# Patient Record
Sex: MALE | Race: OTHER | Employment: FULL TIME | URBAN - METROPOLITAN AREA
[De-identification: names, ages, dates, MRNs, and addresses within clinical notes are randomized per-mention and may not be internally consistent; named-entity substitution may affect disease eponyms.]

---

## 2020-09-05 ENCOUNTER — APPOINTMENT (OUTPATIENT)
Dept: GENERAL RADIOLOGY | Age: 40
End: 2020-09-05
Attending: EMERGENCY MEDICINE

## 2020-09-05 ENCOUNTER — HOSPITAL ENCOUNTER (EMERGENCY)
Age: 40
Discharge: HOME | End: 2020-09-05
Attending: EMERGENCY MEDICINE

## 2020-09-05 VITALS
DIASTOLIC BLOOD PRESSURE: 79 MMHG | RESPIRATION RATE: 8 BRPM | SYSTOLIC BLOOD PRESSURE: 116 MMHG | WEIGHT: 165 LBS | OXYGEN SATURATION: 96 % | TEMPERATURE: 98 F | HEIGHT: 65 IN | HEART RATE: 59 BPM | BODY MASS INDEX: 27.49 KG/M2

## 2020-09-05 DIAGNOSIS — K21.9 GASTROESOPHAGEAL REFLUX DISEASE, ESOPHAGITIS PRESENCE NOT SPECIFIED: Primary | ICD-10-CM

## 2020-09-05 DIAGNOSIS — R00.2 PALPITATIONS: ICD-10-CM

## 2020-09-05 LAB
ALBUMIN SERPL-MCNC: 4 G/DL (ref 3.5–5)
ALBUMIN/GLOB SERPL: 1.3 {RATIO} (ref 1.1–2.2)
ALP SERPL-CCNC: 95 U/L (ref 45–117)
ALT SERPL-CCNC: 32 U/L (ref 12–78)
ANION GAP SERPL CALC-SCNC: 6 MMOL/L (ref 5–15)
AST SERPL-CCNC: 26 U/L (ref 15–37)
BASOPHILS # BLD: 0 K/UL (ref 0–0.1)
BASOPHILS NFR BLD: 1 % (ref 0–1)
BILIRUB SERPL-MCNC: 0.6 MG/DL (ref 0.2–1)
BUN SERPL-MCNC: 15 MG/DL (ref 6–20)
BUN/CREAT SERPL: 15 (ref 12–20)
CALCIUM SERPL-MCNC: 8.6 MG/DL (ref 8.5–10.1)
CHLORIDE SERPL-SCNC: 109 MMOL/L (ref 97–108)
CO2 SERPL-SCNC: 25 MMOL/L (ref 21–32)
COMMENT, HOLDF: NORMAL
CREAT SERPL-MCNC: 1.01 MG/DL (ref 0.7–1.3)
D DIMER PPP FEU-MCNC: <0.19 MG/L FEU (ref 0–0.65)
DIFFERENTIAL METHOD BLD: NORMAL
EOSINOPHIL # BLD: 0.1 K/UL (ref 0–0.4)
EOSINOPHIL NFR BLD: 2 % (ref 0–7)
ERYTHROCYTE [DISTWIDTH] IN BLOOD BY AUTOMATED COUNT: 12 % (ref 11.5–14.5)
GLOBULIN SER CALC-MCNC: 3 G/DL (ref 2–4)
GLUCOSE SERPL-MCNC: 97 MG/DL (ref 65–100)
HCT VFR BLD AUTO: 43 % (ref 36.6–50.3)
HGB BLD-MCNC: 15.3 G/DL (ref 12.1–17)
IMM GRANULOCYTES # BLD AUTO: 0 K/UL (ref 0–0.04)
IMM GRANULOCYTES NFR BLD AUTO: 0 % (ref 0–0.5)
INR PPP: 1.1 (ref 0.9–1.1)
LYMPHOCYTES # BLD: 2 K/UL (ref 0.8–3.5)
LYMPHOCYTES NFR BLD: 31 % (ref 12–49)
MCH RBC QN AUTO: 30.1 PG (ref 26–34)
MCHC RBC AUTO-ENTMCNC: 35.6 G/DL (ref 30–36.5)
MCV RBC AUTO: 84.5 FL (ref 80–99)
MONOCYTES # BLD: 0.6 K/UL (ref 0–1)
MONOCYTES NFR BLD: 9 % (ref 5–13)
NEUTS SEG # BLD: 3.7 K/UL (ref 1.8–8)
NEUTS SEG NFR BLD: 57 % (ref 32–75)
NRBC # BLD: 0 K/UL (ref 0–0.01)
NRBC BLD-RTO: 0 PER 100 WBC
PLATELET # BLD AUTO: 274 K/UL (ref 150–400)
PMV BLD AUTO: 9.8 FL (ref 8.9–12.9)
POTASSIUM SERPL-SCNC: 3.8 MMOL/L (ref 3.5–5.1)
PROT SERPL-MCNC: 7 G/DL (ref 6.4–8.2)
PROTHROMBIN TIME: 11.2 SEC (ref 9–11.1)
RBC # BLD AUTO: 5.09 M/UL (ref 4.1–5.7)
SAMPLES BEING HELD,HOLD: NORMAL
SODIUM SERPL-SCNC: 140 MMOL/L (ref 136–145)
TROPONIN I SERPL-MCNC: <0.05 NG/ML
WBC # BLD AUTO: 6.4 K/UL (ref 4.1–11.1)

## 2020-09-05 PROCEDURE — 80053 COMPREHEN METABOLIC PANEL: CPT

## 2020-09-05 PROCEDURE — 84484 ASSAY OF TROPONIN QUANT: CPT

## 2020-09-05 PROCEDURE — 85025 COMPLETE CBC W/AUTO DIFF WBC: CPT

## 2020-09-05 PROCEDURE — 74011250637 HC RX REV CODE- 250/637: Performed by: EMERGENCY MEDICINE

## 2020-09-05 PROCEDURE — 99285 EMERGENCY DEPT VISIT HI MDM: CPT

## 2020-09-05 PROCEDURE — 36415 COLL VENOUS BLD VENIPUNCTURE: CPT

## 2020-09-05 PROCEDURE — 85610 PROTHROMBIN TIME: CPT

## 2020-09-05 PROCEDURE — 74011000250 HC RX REV CODE- 250: Performed by: EMERGENCY MEDICINE

## 2020-09-05 PROCEDURE — 93005 ELECTROCARDIOGRAM TRACING: CPT

## 2020-09-05 PROCEDURE — 71046 X-RAY EXAM CHEST 2 VIEWS: CPT

## 2020-09-05 PROCEDURE — 85379 FIBRIN DEGRADATION QUANT: CPT

## 2020-09-05 RX ORDER — FAMOTIDINE 20 MG/1
20 TABLET, FILM COATED ORAL 2 TIMES DAILY
Qty: 60 TAB | Refills: 0 | Status: SHIPPED | OUTPATIENT
Start: 2020-09-05 | End: 2020-10-05

## 2020-09-05 RX ADMIN — LIDOCAINE HYDROCHLORIDE 40 ML: 20 SOLUTION ORAL; TOPICAL at 07:59

## 2020-09-05 NOTE — DISCHARGE INSTRUCTIONS
Patient Education        Enfermedad de reflujo gastroesofágico (GERD): Instrucciones de cuidado  Gastroesophageal Reflux Disease (GERD): Care Instructions  Instrucciones de cuidado     La enfermedad por reflujo gastroesofágico (ERGE) es cuando el ácido estomacal fluye de vuelta al esófago. El esófago es el tubo que va desde la garganta al [facility]. Maxene Curlin de teagan vera vía pedro que el ácido estomacal fluya de vuelta por maggie tubo. Cuando usted tiene Uvycvugot-wzèl-Pqfzwd, esta válvula no se bridgette herméticamente. [place] también puede causar dolor e hinchazón en el esófago (esofagitis). Si tiene síntomas leves de ERGE, halie acidez [place], es posible que pueda controlar el problema con antiácidos o medicamentos de [place]. Cambiar la dieta y los hábitos de alimentación, halie no comer tarde por la noche, bajar de peso y hacer otros cambios en el estilo de fe también puede ayudar a [employer]. La atención de seguimiento es teagan parte clave de hood tratamiento y seguridad. Asegúrese de hacer y acudir a todas las citas, y llame a hood médico si está teniendo problemas. También es teagan buena idea saber los resultados de jodie exámenes y mantener teagan lista de los medicamentos que martina. ¿Cómo puede cuidarse en el hogar? · [employer] medicamentos exactamente halie le fueron recetados. Llame a hood médico si nunu estar teniendo problemas con hood medicamento. · Hood médico puede recomendarle medicamentos de [place]. Para la indigestión leve u ocasional, los antiácidos, halie Tums, Gaviscon, Mylanta o Maalox, pueden ayudar. Hood médico también puede recomendarle inhibidores de la secreción ácida de venta maurice, halie Pepcid AC (famotidina), Tagamet HB (cimetidina) o Prilosec (omeprazol). Peggy y siga todas las instrucciones de la [place]. Si utiliza estos medicamentos con frecuencia, hable con hood médico.  · Cambie jodie hábitos de alimentación. ? Es mejor comer varias comidas pequeñas en lugar de dos o suzanne comidas abundantes. ?  Después de comer, espere de 2 a 3 horas antes de recostarse. ? El chocolate, la menta y el alcohol pueden empeorar la Pglyxlsou-fhèf-Ztprji. ? En [employer], los alimentos picantes, los alimentos que tienen mucho ácido (halie los tomates y las naranjas) y el café pueden empeorar los síntomas de la Vkncqwljp-jdèn-Ivyyox. Si jazzy síntomas empeoran después de comer un determinado alimento, se recomienda que deje de comer zuri alimento para radha si jazzy síntomas mejoran. · No fume ni masque tabaco. Fumar puede agravar la ERGE. Si necesita ayuda para dejar de usar tabaco, hable con gaspar médico sobre programas y medicamentos para dejar de usarlo. Estos pueden aumentar jazzy probabilidades de dejar el hábito para siempre. · Si tiene síntomas de Llmrmupor-rwèo-Ycwuuz marcelo la noche, eleve la cabecera de gaspar cama entre 6 y 6 pulgadas (entre 15 y 21 cm) colocando ladrillos debajo del otilio de la cama o teagan cuña de espuma debajo de la cabecera del colchón. (Usar almohadas adicionales no funciona). · No use ropa ajustada alrededor [place] del cuerpo. · Baje de peso, si necesita hacerlo. Perder tan solo entre 5 y 10 libras (2.3 a 4.5 kg) puede ayudarle. ¿Cuándo debe pedir ayuda? Llame a gaspar médico ahora mismo o busque atención médica inmediata si:    · Tiene un dolor de estómago nuevo o distinto.     · Jazzy heces son negruzcas y parecidas al alquitrán o tienen rastros de [place]-ni'. Preste especial atención a los cambios en gaspar dasha y asegúrese de comunicarse con gaspar médico si:    · Jazzy síntomas no corea maria dolores después de 2 días.     · La comida parece quedarle atorada en la garganta o el pecho. ¿Dónde puede encontrar más información en inglés? Yaakov Amezcua a http://violeta-nuno.info/  [employer] W245 en la búsqueda para aprender más acerca de \"Enfermedad de reflujo gastroesofágico (GERD): Instrucciones de cuidado. \"  Revisado: 15 emilie, 2020               Versión del contenido: 12.6  © 5938-2072 Healthwise, Incorporated.    Las instrucciones de cuidado fueron adaptadas bajo licencia por [employer] (which disclaims liability or warranty for this information). Si usted tiene [place] afección médica o sobre estas instrucciones, siempre pregunte a gaspar profesional de dasha. [facility] niega toda garantía o responsabilidad por gaspar uso de esta información. Patient Education        Palpitaciones: Instrucciones de cuidado  Palpitations: Care Instructions  Instrucciones de cuidado    Las palpitaciones cardíacas son [place]  sensación desagradable de que gaspar corazón está latiendo rápido o de forma irregular. Puede sentir [place]  palpitación umang o un aleteo en el pecho. Podría sentirse halie si el corazón estuviese saltándose latidos. Aunque las palpitaciones pueden ser causadas por un problema cardíaco, también pueden ocurrir debido a estrés, fatiga, o al consumo de alcohol, cafeína o nicotina. Muchos medicamentos halie las pastillas para adelgazar, los antihistamínicos, los descongestionantes y algunos productos herbarios pueden causar palpitaciones cardíacas. Bess todas las personas tienen palpitaciones de vez en cuando. Según los ANNERSTAD, gaspar médico podría necesitar hacer más pruebas para tratar de encontrar la causa de jodie palpitaciones. La atención de seguimiento es teagan parte clave de gaspar tratamiento y seguridad. Asegúrese de hacer y acudir a todas las citas, y llame a gaspar médico si está teniendo problemas. También es teagan buena idea saber los resultados de jodie exámenes y mantener teagan lista de los medicamentos que martina. ¿Cómo puede cuidarse en el hogar? · Evite la cafeína, la nicotina y el exceso de alcohol. · No consuma drogas ilegales, halie metanfetaminas y cocaína. · No tome medicamentos para bajar de peso o dietéticos a menos que consulte alfonso con gaspar médico.  · Duerma lo suficiente. · No coma en exceso. · Si tiene palpitaciones de nuevo, tayla respiraciones profundas y trate de [place].  [place] podría reducir la velocidad de un corazón acelerado. · Si comienza a sentir [place], acuéstese para evitar las lesiones que podrían ocurrir si se Luwanna Rave y  al suelo. · Mantenga un registro de jodie palpitaciones y llévelo a la siguiente britt con el médico. Anote:  ? Para Clause y la hora. ? Gaspar pulso. (Si gaspar corazón late rápido, podría ser difícil tomarse el pulso). ? Lo que estaba haciendo cuando Grant Alessandra. ?  [facility] ? Cualquier otro síntoma. · Si teagan actividad le causa palpitaciones, reduzca el ritmo o deje de [place]. Hable con gaspar médico antes de volver a hacer brigitte actividad. · [employer] medicamentos exactamente halie le fueron recetados. Llame a gaspar médico si nunu estar teniendo problemas con gaspar medicamento. ¿Cuándo debe pedir ayuda? Llame al 91 en cualquier momento que considere que necesita atención de [place]. Por ejemplo, llame si:    · Se desmayó (perdió el conocimiento).     · Tiene síntomas de un ataque al corazón. Estos podrían incluir:  ? Lucía [place] en el pecho, o teagan sensación extraña en el pecho.  ? Sudoración. ? Falta de aire. ? Dolor, presión o teagan sensación extraña en la espalda, el kiki, la mandíbula, la parte superior del abdomen, o en mauro o ambos hombros o brazos. ? Aturdimiento o debilidad repentina. ? Latidos cardíacos rápidos o irregulares. Después de llamar al 911, es posible que el operador le diga que mastique 1 aspirina para adultos o 2 a 4 aspirinas de dosis baja. Espere a la ambulancia. No trate de conducir usted mismo un automóvil.     · Tiene síntomas de un ataque cerebral. Estos pueden incluir:  ? Entumecimiento, hormigueo, debilidad o parálisis repentinos en la simran, el brazo o la pierna, sobre todo si ocurre en un solo lado del cuerpo. ? Cambios súbitos en la vista. ? Problemas repentinos para hablar. ? Confusión súbita o dificultad repentina para comprender frases sencillas. ? Problemas repentinos para caminar o mantener el equilibrio. ?  Un dolor de matthias intenso y repentino, distinto a los carlos de matthias anteriores. Llame a gaspar médico ahora mismo o busque atención médica inmediata si:    · Presenta palpitaciones cardíacas y:  ? Raydell Salvia o aturdimiento, o siente que se va a desmayar. ? Tiene nueva o mayor falta de aire. Preste especial atención a los cambios en gaspar dasha y asegúrese de comunicarse con gaspar médico si:    · Continúa teniendo palpitaciones. ¿Dónde puede encontrar más información en inglés? Vaya a http://viloeta-nuno.info/  Yelena Pall R508 en la búsqueda para aprender más acerca de \"Palpitaciones: Instrucciones de cuidado. \"  Revisado: 16 jacques, 5481               CASPER del contenido: 12.6  © 1704-3826 Healthwise, Incorporated. Las instrucciones de cuidado fueron adaptadas bajo licencia por [employer] (which disclaims liability or warranty for this information). Si usted tiene [place]o afección médica o sobre estas instrucciones, siempre pregunte a gaspar profesional de dasha. Healthwise, Incorporated niega toda garantía o responsabilidad por gaspar uso de esta información.

## 2020-09-05 NOTE — ED TRIAGE NOTES
Patient clarified that chest pain actually began 2 weeks ago while sleeping. He also adds, \"I am unsure of results of coronovirus test I had done in May. \" Denies associated symptoms.

## 2020-09-05 NOTE — ED TRIAGE NOTES
Triage completed using [employer] phone for translation (Interpretor #[id number]). Plan of care discussed and questions answered. Patient presents with mid-sternal chest pain that began yesterday. Patient denies nausea and/or diaphoresis with chest pain.

## 2020-09-05 NOTE — ED PROVIDER NOTES
The history is provided by the patient. Chest Pain    This is a new problem. The current episode started more than 1 week ago. The problem has not changed since onset. The problem occurs daily. The pain is associated with eating food. The pain is present in the substernal region. The pain is at a severity of 10/10. The pain is severe. The quality of the pain is described as burning. The pain does not radiate. The symptoms are aggravated by eating. Pertinent negatives include no abdominal pain, no back pain, no claudication, no cough, no diaphoresis, no dizziness, no exertional chest pressure, no fever, no headaches, no hemoptysis, no irregular heartbeat, no leg pain, no lower extremity edema, no malaise/fatigue, no nausea, no near-syncope, no numbness, no orthopnea, no palpitations, no PND, no shortness of breath, no sputum production, no vomiting and no weakness. He has tried nothing for the symptoms. The treatment provided no relief. Risk factors include no risk factors. His past medical history does not include aneurysm, cancer, DM, DVT, HTN, PE or CHF. No past medical history on file. No past surgical history on file. No family history on file.     Social History     Socioeconomic History    Marital status: Not on file     Spouse name: Not on file    Number of children: Not on file    Years of education: Not on file    Highest education level: Not on file   Occupational History    Not on file   Social Needs    Financial resource strain: Not on file    Food insecurity     Worry: Not on file     Inability: Not on file    Transportation needs     Medical: Not on file     Non-medical: Not on file   Tobacco Use    Smoking status: Not on file   Substance and Sexual Activity    Alcohol use: Not on file    Drug use: Not on file    Sexual activity: Not on file   Lifestyle    Physical activity     Days per week: Not on file     Minutes per session: Not on file    Stress: Not on file Relationships    Social connections     Talks on phone: Not on file     Gets together: Not on file     Attends [employer] service: Not on file     Active member of club or organization: Not on file     Attends meetings of clubs or organizations: Not on file     Relationship status: Not on file    Intimate partner violence     Fear of current or ex partner: Not on file     Emotionally abused: Not on file     Physically abused: Not on file     Forced sexual activity: Not on file   Other Topics Concern    Not on file   Social History Narrative    Not on file         ALLERGIES: Patient has no known allergies. Review of Systems   Constitutional: Negative for activity change, chills, diaphoresis, fever and malaise/fatigue. HENT: Negative for nosebleeds, sore throat, trouble swallowing and voice change. Eyes: Negative for visual disturbance. Respiratory: Negative for cough, hemoptysis, sputum production and shortness of breath. Cardiovascular: Positive for chest pain. Negative for palpitations, orthopnea, claudication, PND and near-syncope. Gastrointestinal: Negative for abdominal pain, constipation, diarrhea, nausea and vomiting. Genitourinary: Negative for difficulty urinating, dysuria, hematuria and urgency. Musculoskeletal: Negative for back pain, neck pain and neck stiffness. Skin: Negative for color change. Allergic/Immunologic: Negative for immunocompromised state. Neurological: Negative for dizziness, seizures, syncope, weakness, light-headedness, numbness and headaches. Psychiatric/Behavioral: Negative for behavioral problems, confusion, hallucinations, self-injury and suicidal ideas. Vitals:    09/05/20 0733   BP: 128/80   Pulse: 64   Resp: 16   Temp: 98 °F (36.7 °C)   SpO2: 98%   Weight: 74.8 kg (165 lb)   Height: 5' 5.35\" (1.66 m)            Physical Exam  Vitals signs and nursing note reviewed. Constitutional:       General: He is not in acute distress.      Appearance: He is well-developed. He is not diaphoretic. HENT:      Head: Normocephalic and atraumatic. Eyes:      Pupils: Pupils are equal, round, and reactive to light. Neck:      Musculoskeletal: Normal range of motion and neck supple. Cardiovascular:      Rate and Rhythm: Normal rate and regular rhythm. Heart sounds: Normal heart sounds. No murmur. No friction rub. No gallop. Pulmonary:      Effort: Pulmonary effort is normal. No respiratory distress. Breath sounds: Normal breath sounds. No wheezing. Abdominal:      General: Bowel sounds are normal. There is no distension. Palpations: Abdomen is soft. Tenderness: There is no abdominal tenderness. There is no guarding or rebound. Musculoskeletal: Normal range of motion. Skin:     General: Skin is warm. Findings: No rash. Neurological:      Mental Status: He is alert and oriented to person, place, and time. Psychiatric:         Behavior: Behavior normal.         Thought Content: Thought content normal.         Judgment: Judgment normal.          MDM      This is a 42-year-old male with past medical history, review of systems, physical exam as above, presenting with complaints of 2 weeks of substernal chest pain, radiating into his throat, worse when eating \"heavy or spicy\" foods. Patient states attempting some over-the-counter remedies without relief. He denies a history of tobacco use, alcohol ER use, or coronary artery disease. Patient denies previous cardiac testing. Physical exam is remarkable for well-appearing middle-aged male, in no acute distress, noted to be normotensive, without tachycardia, afebrile, with clear breath sounds, soft abdomen, regular rate and rhythm without murmurs gallops or rubs, chest pain is nonreproducible with palpation. Differential includes ACS, chest wall pain, GERD, pneumothorax. Plan to obtain CMP, CBC, EKG, chest x-ray, cardiac enzymes and d-dimer.   Will provide GI cocktail, reassess, and make a disposition.     Procedures

## 2020-09-08 LAB
ATRIAL RATE: 60 BPM
CALCULATED P AXIS, ECG09: 72 DEGREES
CALCULATED R AXIS, ECG10: 56 DEGREES
CALCULATED T AXIS, ECG11: 45 DEGREES
DIAGNOSIS, 93000: NORMAL
P-R INTERVAL, ECG05: 150 MS
Q-T INTERVAL, ECG07: 428 MS
QRS DURATION, ECG06: 88 MS
QTC CALCULATION (BEZET), ECG08: 428 MS
VENTRICULAR RATE, ECG03: 60 BPM